# Patient Record
Sex: FEMALE | Race: WHITE | NOT HISPANIC OR LATINO | URBAN - METROPOLITAN AREA
[De-identification: names, ages, dates, MRNs, and addresses within clinical notes are randomized per-mention and may not be internally consistent; named-entity substitution may affect disease eponyms.]

---

## 2018-07-27 ENCOUNTER — OUTPATIENT (OUTPATIENT)
Dept: OUTPATIENT SERVICES | Facility: HOSPITAL | Age: 68
LOS: 1 days | Discharge: HOME | End: 2018-07-27

## 2018-07-27 DIAGNOSIS — R06.9 UNSPECIFIED ABNORMALITIES OF BREATHING: ICD-10-CM

## 2018-07-27 DIAGNOSIS — R06.02 SHORTNESS OF BREATH: ICD-10-CM

## 2019-07-01 PROBLEM — Z00.00 ENCOUNTER FOR PREVENTIVE HEALTH EXAMINATION: Status: ACTIVE | Noted: 2019-07-01

## 2019-07-09 ENCOUNTER — APPOINTMENT (OUTPATIENT)
Dept: CARDIOLOGY | Facility: CLINIC | Age: 69
End: 2019-07-09
Payer: MEDICARE

## 2019-07-09 ENCOUNTER — RECORD ABSTRACTING (OUTPATIENT)
Age: 69
End: 2019-07-09

## 2019-07-09 VITALS
SYSTOLIC BLOOD PRESSURE: 116 MMHG | BODY MASS INDEX: 27.6 KG/M2 | DIASTOLIC BLOOD PRESSURE: 74 MMHG | HEART RATE: 85 BPM | HEIGHT: 62 IN | WEIGHT: 150 LBS

## 2019-07-09 DIAGNOSIS — M19.90 UNSPECIFIED OSTEOARTHRITIS, UNSPECIFIED SITE: ICD-10-CM

## 2019-07-09 DIAGNOSIS — E72.12 METHYLENETETRAHYDROFOLATE REDUCTASE DEFICIENCY: ICD-10-CM

## 2019-07-09 DIAGNOSIS — Z86.2 PERSONAL HISTORY OF DISEASES OF THE BLOOD AND BLOOD-FORMING ORGANS AND CERTAIN DISORDERS INVOLVING THE IMMUNE MECHANISM: ICD-10-CM

## 2019-07-09 DIAGNOSIS — R32 UNSPECIFIED URINARY INCONTINENCE: ICD-10-CM

## 2019-07-09 DIAGNOSIS — Z86.010 PERSONAL HISTORY OF COLONIC POLYPS: ICD-10-CM

## 2019-07-09 DIAGNOSIS — Z92.89 PERSONAL HISTORY OF OTHER MEDICAL TREATMENT: ICD-10-CM

## 2019-07-09 DIAGNOSIS — I74.9 EMBOLISM AND THROMBOSIS OF UNSPECIFIED ARTERY: ICD-10-CM

## 2019-07-09 DIAGNOSIS — Z78.9 OTHER SPECIFIED HEALTH STATUS: ICD-10-CM

## 2019-07-09 DIAGNOSIS — Z86.79 PERSONAL HISTORY OF OTHER DISEASES OF THE CIRCULATORY SYSTEM: ICD-10-CM

## 2019-07-09 PROCEDURE — 93000 ELECTROCARDIOGRAM COMPLETE: CPT

## 2019-07-09 PROCEDURE — 99214 OFFICE O/P EST MOD 30 MIN: CPT

## 2019-07-09 RX ORDER — MULTIVIT-MIN/FOLIC/VIT K/LYCOP 400-300MCG
50 MCG TABLET ORAL
Qty: 90 | Refills: 1 | Status: ACTIVE | COMMUNITY

## 2019-07-09 NOTE — PHYSICAL EXAM
[Normal Appearance] : normal appearance [General Appearance - Well Developed] : well developed [No Deformities] : no deformities [Well Groomed] : well groomed [General Appearance - Well Nourished] : well nourished [General Appearance - In No Acute Distress] : no acute distress [Normal Conjunctiva] : the conjunctiva exhibited no abnormalities [Normal Oral Mucosa] : normal oral mucosa [Normal Oropharynx] : normal oropharynx [] : no respiratory distress [FreeTextEntry1] : no JVD [Respiration, Rhythm And Depth] : normal respiratory rhythm and effort [Auscultation Breath Sounds / Voice Sounds] : lungs were clear to auscultation bilaterally [Heart Rate And Rhythm] : heart rate and rhythm were normal [Murmurs] : no murmurs present [Heart Sounds] : normal S1 and S2 [Bowel Sounds] : normal bowel sounds [Edema] : no peripheral edema present [Abdomen Soft] : soft [Abdomen Tenderness] : non-tender [Abnormal Walk] : normal gait [Nail Clubbing] : no clubbing of the fingernails [Skin Color & Pigmentation] : normal skin color and pigmentation [Cyanosis, Localized] : no localized cyanosis [No Venous Stasis] : no venous stasis [Oriented To Time, Place, And Person] : oriented to person, place, and time [Affect] : the affect was normal

## 2019-07-09 NOTE — ASSESSMENT
[FreeTextEntry1] :  The history was reviewed, and discussed with the patient.\par Prior work-up reviewed.\par Given the increase in symptoms, nuclear stress test and CTA results - occluded LIMA - would consider cath. \par She is hezitant, so will get a non-invasive study instead (Nuclear MPI).\par Due to her claudication, will be able to look at the peripheral vessels as well.  C/w medical therapy - discussed in detail.\par C/w BB and Losartan for BP control.\par C/w metoprolol, statin.\par C/w Ranexa.\par Hematology management discussed with Dr. Martinez - will continue with Plavix for now.

## 2019-07-09 NOTE — HISTORY OF PRESENT ILLNESS
[FreeTextEntry1] : The patient presents for a f/u of chest pain, upper left side, worse with exertion, as well as throat pain, non-exertional, difficulty swallowing. CTA was done and revealed occluded LIMA to LAD. Distal LAD occlusion. No palpitations. She had colonoscopy and several polyps were removed. Had sonogram with GI office - was told she has a "plaque in the aorta".  She has a long history of CAD, s/p PCI, complicated by acute stent thrombosis, requiring CABG, had vascular injury in the process, requiring vascular surgery, thrombectomy. During hospitalization had DVT. The patient was diagnosed with heterozygous Factor V Leiden and MTHFR and was on Coumadin for several years, but subsequently switched to ASA. She also had symptoms which were suggestive of temporal arteritis, and had a biopsy in [place], which was negative. She subsequently had a questionable diagnosis of Giant Cell arteritis on femoral artery biopsy, also done in [place], but diagnosis was never confirmed. Could not tolerate ASA due to GI symptoms, was switched to Plavix.\par Last stress test submaximal, but negative to the level achieved. Was seen by Dr. Martinez for Hematology. Initially planned for anticoagulation. I have discussed her history with him and we made a decision to c/w antiplatelets, as her event appears to have been provoked, and she had no recurrent evens since.

## 2019-07-18 ENCOUNTER — FORM ENCOUNTER (OUTPATIENT)
Age: 69
End: 2019-07-18

## 2019-07-19 ENCOUNTER — OUTPATIENT (OUTPATIENT)
Dept: OUTPATIENT SERVICES | Facility: HOSPITAL | Age: 69
LOS: 1 days | Discharge: HOME | End: 2019-07-19
Payer: MEDICARE

## 2019-07-19 DIAGNOSIS — R06.00 DYSPNEA, UNSPECIFIED: ICD-10-CM

## 2019-07-19 PROCEDURE — 78452 HT MUSCLE IMAGE SPECT MULT: CPT | Mod: 26

## 2020-03-26 RX ORDER — CLOPIDOGREL BISULFATE 75 MG/1
75 TABLET, FILM COATED ORAL DAILY
Qty: 90 | Refills: 2 | Status: ACTIVE | COMMUNITY
Start: 1900-01-01 | End: 1900-01-01

## 2020-04-10 ENCOUNTER — APPOINTMENT (OUTPATIENT)
Dept: CARDIOLOGY | Facility: CLINIC | Age: 70
End: 2020-04-10
Payer: MEDICARE

## 2020-04-10 PROCEDURE — 99213 OFFICE O/P EST LOW 20 MIN: CPT | Mod: 95

## 2020-04-10 NOTE — PHYSICAL EXAM
[General Appearance - Well Developed] : well developed [Normal Appearance] : normal appearance [Well Groomed] : well groomed [General Appearance - Well Nourished] : well nourished [No Deformities] : no deformities [General Appearance - In No Acute Distress] : no acute distress [Normal Conjunctiva] : the conjunctiva exhibited no abnormalities [Normal Oral Mucosa] : normal oral mucosa [Normal Oropharynx] : normal oropharynx [FreeTextEntry1] : no JVD [] : no respiratory distress [Oriented To Time, Place, And Person] : oriented to person, place, and time [Affect] : the affect was normal

## 2020-04-10 NOTE — ASSESSMENT
[FreeTextEntry1] : Due to COVID-19 epidemic, a telemedicine visit was conducted, using audio and video connection. Patient gave verbal consent to telemedicine visit.\par \par \par  The history was reviewed, and discussed with the patient.\par Prior work-up reviewed.\par Given the increase in symptoms, nuclear stress test and CTA results - occluded LIMA - would consider cath, but will hold off due to the current COVID-19 epidemic. \par If worsening symptoms will bring her in as an emergency, otherwise, will continue with medical therapy until the COVID-19 situation improves.\par Due to her claudication, will be able to look at the peripheral vessels as well.  C/w medical therapy - discussed in detail.\par C/w BB and Losartan for BP control.\par C/w metoprolol, statin.\par C/w Ranexa.\par Hematology management previously discussed with Dr. Martinez - will continue with Plavix for now.

## 2020-04-24 ENCOUNTER — TRANSCRIPTION ENCOUNTER (OUTPATIENT)
Age: 70
End: 2020-04-24

## 2020-06-09 ENCOUNTER — LABORATORY RESULT (OUTPATIENT)
Age: 70
End: 2020-06-09

## 2020-06-11 ENCOUNTER — OUTPATIENT (OUTPATIENT)
Dept: OUTPATIENT SERVICES | Facility: HOSPITAL | Age: 70
LOS: 1 days | Discharge: HOME | End: 2020-06-11
Payer: MEDICARE

## 2020-06-11 DIAGNOSIS — Z95.1 PRESENCE OF AORTOCORONARY BYPASS GRAFT: Chronic | ICD-10-CM

## 2020-06-11 LAB
ANION GAP SERPL CALC-SCNC: 10 MMOL/L — SIGNIFICANT CHANGE UP (ref 7–14)
BUN SERPL-MCNC: 15 MG/DL — SIGNIFICANT CHANGE UP (ref 10–20)
CALCIUM SERPL-MCNC: 9.1 MG/DL — SIGNIFICANT CHANGE UP (ref 8.5–10.1)
CHLORIDE SERPL-SCNC: 98 MMOL/L — SIGNIFICANT CHANGE UP (ref 98–110)
CO2 SERPL-SCNC: 24 MMOL/L — SIGNIFICANT CHANGE UP (ref 17–32)
CREAT SERPL-MCNC: 0.9 MG/DL — SIGNIFICANT CHANGE UP (ref 0.7–1.5)
GLUCOSE SERPL-MCNC: 103 MG/DL — HIGH (ref 70–99)
HCT VFR BLD CALC: 36.9 % — LOW (ref 37–47)
HGB BLD-MCNC: 12.1 G/DL — SIGNIFICANT CHANGE UP (ref 12–16)
MCHC RBC-ENTMCNC: 26.1 PG — LOW (ref 27–31)
MCHC RBC-ENTMCNC: 32.8 G/DL — SIGNIFICANT CHANGE UP (ref 32–37)
MCV RBC AUTO: 79.5 FL — LOW (ref 81–99)
NRBC # BLD: 0 /100 WBCS — SIGNIFICANT CHANGE UP (ref 0–0)
PLATELET # BLD AUTO: 146 K/UL — SIGNIFICANT CHANGE UP (ref 130–400)
POTASSIUM SERPL-MCNC: 4.9 MMOL/L — SIGNIFICANT CHANGE UP (ref 3.5–5)
POTASSIUM SERPL-SCNC: 4.9 MMOL/L — SIGNIFICANT CHANGE UP (ref 3.5–5)
RBC # BLD: 4.64 M/UL — SIGNIFICANT CHANGE UP (ref 4.2–5.4)
RBC # FLD: 14.2 % — SIGNIFICANT CHANGE UP (ref 11.5–14.5)
SODIUM SERPL-SCNC: 132 MMOL/L — LOW (ref 135–146)
WBC # BLD: 4.83 K/UL — SIGNIFICANT CHANGE UP (ref 4.8–10.8)
WBC # FLD AUTO: 4.83 K/UL — SIGNIFICANT CHANGE UP (ref 4.8–10.8)

## 2020-06-11 PROCEDURE — 36247 INS CATH ABD/L-EXT ART 3RD: CPT

## 2020-06-11 PROCEDURE — 93459 L HRT ART/GRFT ANGIO: CPT | Mod: 26

## 2020-06-11 PROCEDURE — 75716 ARTERY X-RAYS ARMS/LEGS: CPT | Mod: 26,59

## 2020-06-11 PROCEDURE — 36248 INS CATH ABD/L-EXT ART ADDL: CPT

## 2020-06-11 RX ORDER — ROSUVASTATIN CALCIUM 5 MG/1
1 TABLET ORAL
Qty: 0 | Refills: 0 | DISCHARGE

## 2020-06-11 RX ORDER — MEMANTINE HYDROCHLORIDE 10 MG/1
1 TABLET ORAL
Qty: 0 | Refills: 0 | DISCHARGE

## 2020-06-11 RX ORDER — LOSARTAN POTASSIUM 100 MG/1
1 TABLET, FILM COATED ORAL
Qty: 0 | Refills: 0 | DISCHARGE

## 2020-06-11 RX ORDER — RANOLAZINE 500 MG/1
1 TABLET, FILM COATED, EXTENDED RELEASE ORAL
Qty: 0 | Refills: 0 | DISCHARGE

## 2020-06-11 RX ORDER — CLOPIDOGREL BISULFATE 75 MG/1
1 TABLET, FILM COATED ORAL
Qty: 0 | Refills: 0 | DISCHARGE

## 2020-06-11 RX ORDER — DEXLANSOPRAZOLE 30 MG/1
1 CAPSULE, DELAYED RELEASE ORAL
Qty: 0 | Refills: 0 | DISCHARGE

## 2020-06-11 RX ORDER — METOPROLOL TARTRATE 50 MG
1 TABLET ORAL
Qty: 0 | Refills: 0 | DISCHARGE

## 2020-06-11 NOTE — CHART NOTE - NSCHARTNOTEFT_GEN_A_CORE
PRE-OP DIAGNOSIS: CAD s/p CABG (LIMA to LAD ), PAD, hx of CAD s/p MI PCI complicated by thrombosis s/p urgent CABG with LIMA to LAD and complicated by femoral artery aneurysm s/p vascular repair.     PROCEDURE: [facility] with coronary angiography, Peripheral angiography    Physician: DR Hannah  Assistant: Noah Kline    ANESTHESIA TYPE:  [  ]General Anesthesia  [  ] Sedation  [ x ] Local/Regional    ESTIMATED BLOOD LOSS:    10   mL    CONDITION  [  ] Critical  [  ] Serious  [  ]Fair  [ x ]Good      SPECIMENS REMOVED (IF APPLICABLE): N/A      IV CONTRAST:     150    mL      IMPLANTS (IF APPLICABLE)      FINDINGS    Left Heart Catheterization:  LVEDP: normal       LEFT HEART CATHETERIZATION                                    Left main normal      LAD: prox 30% lesion , MId/distal: minor irregularities                       Diag: patent     Left Circumflex: normal   OM: patent     Right Coronary Artery: minor irregularities  RPDA normal     LIMA to LAD , occleded     DOMINANCE: Right    Peripheral angiogram:  patent bilateral iliac, femoral (SFA/Deep femoral), popliteal  arteries, slow flow     ACCESS: left radial   CLOSURE: Dstat    INTERVENTION  none         POST-OP DIAGNOSIS  non obstructive CAD  LIMA to LAD occluded  patent bilateral lower extremities  arteries wit slow flow likely secondary to distal microvascular disease         PLAN OF CARE  [x ] D/C Home today  continue home medication    Results of procedure/ plan of care discussed with patient/  in detail. PRE-OP DIAGNOSIS: CAD s/p CABG (LIMA to LAD ), PAD, hx of CAD s/p MI PCI complicated by thrombosis s/p urgent CABG with LIMA to LAD and complicated by femoral artery aneurysm s/p vascular repair.     PROCEDURE: [facility] with coronary angiography, Peripheral angiography    Physician: DR Hannah  Assistant: Noah Kline    ANESTHESIA TYPE:  [  ]General Anesthesia  [  ] Sedation  [ x ] Local/Regional    ESTIMATED BLOOD LOSS:    10   mL    CONDITION  [  ] Critical  [  ] Serious  [  ]Fair  [ x ]Good      SPECIMENS REMOVED (IF APPLICABLE): N/A      IV CONTRAST:     150    mL      IMPLANTS (IF APPLICABLE)      FINDINGS    Left Heart Catheterization:  LVEDP: normal       LEFT HEART CATHETERIZATION                                    Left main normal      LAD: prox 30% lesion , MId/distal: minor irregularities                       Diag: patent     Left Circumflex: normal   OM: patent     Right Coronary Artery: minor irregularities  RPDA normal     LIMA to LAD , occluded     DOMINANCE: Right    Peripheral angiogram:  patent bilateral iliac, femoral (SFA/Deep femoral), popliteal  arteries, slow flow     ACCESS: left radial   CLOSURE: Dstat    INTERVENTION  none         POST-OP DIAGNOSIS  non obstructive CAD  LIMA to LAD occluded  patent bilateral lower extremities  arteries wit slow flow likely secondary to distal microvascular disease         PLAN OF CARE  [x ] D/C Home today  continue home medication    Results of procedure/ plan of care discussed with patient/  in detail.

## 2020-06-11 NOTE — H&P CARDIOLOGY - PMH
Coronary artery disease    Factor V Leiden mutation    Hyperlipidemia    Hypertension    MTHFR gene mutation    PVD (peripheral vascular disease)

## 2020-06-11 NOTE — H&P CARDIOLOGY - HISTORY OF PRESENT ILLNESS
69 y/o F with PMH of HTN, DLD MI, CAD s/p PCI, CABG with LIMA to LAD, PVD with possible giant cell arteritis on femoral artery, factor V Leiden mutation and MTHFR mutation previously on coumadin and currently on plavix presented with left sided chest pain worsened on exertion and claudication in the legs. Previous CCTA showed possible occluded LIMA.

## 2020-06-12 PROBLEM — I10 ESSENTIAL (PRIMARY) HYPERTENSION: Chronic | Status: ACTIVE | Noted: 2020-06-11

## 2020-06-12 PROBLEM — I25.10 ATHEROSCLEROTIC HEART DISEASE OF NATIVE CORONARY ARTERY WITHOUT ANGINA PECTORIS: Chronic | Status: ACTIVE | Noted: 2020-06-11

## 2020-06-12 PROBLEM — E72.12 METHYLENETETRAHYDROFOLATE REDUCTASE DEFICIENCY: Chronic | Status: ACTIVE | Noted: 2020-06-11

## 2020-06-12 PROBLEM — E78.5 HYPERLIPIDEMIA, UNSPECIFIED: Chronic | Status: ACTIVE | Noted: 2020-06-11

## 2020-06-12 PROBLEM — I73.9 PERIPHERAL VASCULAR DISEASE, UNSPECIFIED: Chronic | Status: ACTIVE | Noted: 2020-06-11

## 2020-06-12 PROBLEM — D68.51 ACTIVATED PROTEIN C RESISTANCE: Chronic | Status: ACTIVE | Noted: 2020-06-11

## 2020-06-15 DIAGNOSIS — I25.118 ATHEROSCLEROTIC HEART DISEASE OF NATIVE CORONARY ARTERY WITH OTHER FORMS OF ANGINA PECTORIS: ICD-10-CM

## 2020-06-15 DIAGNOSIS — I25.2 OLD MYOCARDIAL INFARCTION: ICD-10-CM

## 2020-06-15 DIAGNOSIS — Z95.1 PRESENCE OF AORTOCORONARY BYPASS GRAFT: ICD-10-CM

## 2020-06-15 DIAGNOSIS — Z82.49 FAMILY HISTORY OF ISCHEMIC HEART DISEASE AND OTHER DISEASES OF THE CIRCULATORY SYSTEM: ICD-10-CM

## 2020-06-15 DIAGNOSIS — Z87.891 PERSONAL HISTORY OF NICOTINE DEPENDENCE: ICD-10-CM

## 2020-06-15 DIAGNOSIS — R94.39 ABNORMAL RESULT OF OTHER CARDIOVASCULAR FUNCTION STUDY: ICD-10-CM

## 2020-06-15 DIAGNOSIS — I10 ESSENTIAL (PRIMARY) HYPERTENSION: ICD-10-CM

## 2020-06-15 DIAGNOSIS — E78.49 OTHER HYPERLIPIDEMIA: ICD-10-CM

## 2020-06-15 DIAGNOSIS — I70.213 ATHEROSCLEROSIS OF NATIVE ARTERIES OF EXTREMITIES WITH INTERMITTENT CLAUDICATION, BILATERAL LEGS: ICD-10-CM

## 2020-06-15 DIAGNOSIS — Z79.02 LONG TERM (CURRENT) USE OF ANTITHROMBOTICS/ANTIPLATELETS: ICD-10-CM

## 2020-06-15 DIAGNOSIS — D68.51 ACTIVATED PROTEIN C RESISTANCE: ICD-10-CM

## 2020-07-17 ENCOUNTER — APPOINTMENT (OUTPATIENT)
Dept: CARDIOLOGY | Facility: CLINIC | Age: 70
End: 2020-07-17
Payer: MEDICARE

## 2020-07-17 ENCOUNTER — RESULT CHARGE (OUTPATIENT)
Age: 70
End: 2020-07-17

## 2020-07-17 VITALS — DIASTOLIC BLOOD PRESSURE: 82 MMHG | WEIGHT: 150 LBS | SYSTOLIC BLOOD PRESSURE: 142 MMHG | BODY MASS INDEX: 27.44 KG/M2

## 2020-07-17 PROCEDURE — 99213 OFFICE O/P EST LOW 20 MIN: CPT

## 2020-07-17 PROCEDURE — 93000 ELECTROCARDIOGRAM COMPLETE: CPT

## 2020-07-17 RX ORDER — MEMANTINE HYDROCHLORIDE 10 MG/1
10 TABLET, FILM COATED ORAL
Refills: 0 | Status: DISCONTINUED | COMMUNITY
End: 2020-07-17

## 2020-07-17 RX ORDER — RANOLAZINE 500 MG/1
500 TABLET, FILM COATED, EXTENDED RELEASE ORAL
Refills: 0 | Status: DISCONTINUED | COMMUNITY
End: 2020-07-17

## 2020-07-17 NOTE — ASSESSMENT
[FreeTextEntry1] : Cath - non-obstructive.\par C/w medical therapy.\par  \par Prior work-up reviewed.\par \par C/w BB and Losartan for BP control.\par C/w metoprolol, statin.\par C/w PRN nitro\par Hematology management previously discussed with Dr. Martinez - will continue with Plavix for now.\par \par RTC in 6 months.

## 2020-07-17 NOTE — PHYSICAL EXAM
[Well Groomed] : well groomed [Normal Appearance] : normal appearance [General Appearance - Well Developed] : well developed [No Deformities] : no deformities [General Appearance - Well Nourished] : well nourished [General Appearance - In No Acute Distress] : no acute distress [Normal Oral Mucosa] : normal oral mucosa [Normal Oropharynx] : normal oropharynx [Normal Conjunctiva] : the conjunctiva exhibited no abnormalities [FreeTextEntry1] : no JVD [] : no respiratory distress [Auscultation Breath Sounds / Voice Sounds] : lungs were clear to auscultation bilaterally [Respiration, Rhythm And Depth] : normal respiratory rhythm and effort [Heart Rate And Rhythm] : heart rate and rhythm were normal [Heart Sounds] : normal S1 and S2 [Murmurs] : no murmurs present [Edema] : no peripheral edema present [Abdomen Soft] : soft [Bowel Sounds] : normal bowel sounds [Abdomen Tenderness] : non-tender [Nail Clubbing] : no clubbing of the fingernails [Abnormal Walk] : normal gait [Cyanosis, Localized] : no localized cyanosis [Oriented To Time, Place, And Person] : oriented to person, place, and time [Affect] : the affect was normal

## 2020-07-17 NOTE — HISTORY OF PRESENT ILLNESS
[FreeTextEntry1] : F/u after cath. non-obstructive CAD. LIMA occluded. Access site healed well.\par \par The patient presents for a f/u of chest pain, upper left side, worse with exertion, as well as throat pain, nonexertional, difficulty swallowing. CTA was done and revealed occluded LIMA to LAD. Distal LAD occlusion. No palpitations. She had colonoscopy and several polyps were removed. Had sonogram with GI office - was told she has a "plaque in the aorta".  She has a long history of CAD, s/p PCI, complicated by acute stent thrombosis, requiring CABG, had vascular injury in the process, requiring vascular surgery, thrombectomy. During hospitalization had DVT. The patient was diagnosed with heterozygous Factor V Leiden and MTHFR and was on Coumadin for several years, but subsequently switched to ASA. She also had symptoms which were suggestive of temporal arteritis, and had a biopsy in [facility], which was negative. She subsequently had a questionable diagnosis of Giant Cell arteritis on femoral artery biopsy, also done in [place], but diagnosis was never confirmed. Could not tolerate ASA due to GI symptoms, was switched to Plavix.\par Last stress test submaximal, but negative to the level achieved. Was seen by Dr. Martinez for Hematology. Initially planned for anticoagulation. I have discussed her history with him and we made a decision to c/w antiplatelets, as her event appears to have been provoked, and she had no recurrent evens since.

## 2021-02-26 ENCOUNTER — RX RENEWAL (OUTPATIENT)
Age: 71
End: 2021-02-26

## 2021-03-04 RX ORDER — METOPROLOL SUCCINATE 25 MG/1
25 TABLET, EXTENDED RELEASE ORAL
Qty: 90 | Refills: 3 | Status: ACTIVE | COMMUNITY
Start: 1900-01-01 | End: 1900-01-01

## 2021-05-13 NOTE — ASU PATIENT PROFILE, ADULT - ACCEPTABLE
Head,  normocephalic,  atraumatic,  Face,  Face within normal limits,  Ears,  External ears within normal limits,  Nose/Nasopharynx,  External nose  normal appearance,  nares patent,  no nasal discharge,  Mouth and Throat,  Oral cavity appearance normal,  Breath odor normal,  Lips,  Appearance normal 0

## 2021-09-10 ENCOUNTER — NON-APPOINTMENT (OUTPATIENT)
Age: 71
End: 2021-09-10

## 2021-09-10 ENCOUNTER — APPOINTMENT (OUTPATIENT)
Dept: CARDIOLOGY | Facility: CLINIC | Age: 71
End: 2021-09-10
Payer: MEDICARE

## 2021-09-10 VITALS
SYSTOLIC BLOOD PRESSURE: 140 MMHG | RESPIRATION RATE: 16 BRPM | OXYGEN SATURATION: 93 % | HEIGHT: 62 IN | TEMPERATURE: 97 F | WEIGHT: 145 LBS | BODY MASS INDEX: 26.68 KG/M2 | HEART RATE: 82 BPM | DIASTOLIC BLOOD PRESSURE: 90 MMHG

## 2021-09-10 PROCEDURE — 99214 OFFICE O/P EST MOD 30 MIN: CPT

## 2021-09-10 PROCEDURE — 93000 ELECTROCARDIOGRAM COMPLETE: CPT

## 2021-09-10 RX ORDER — ROSUVASTATIN CALCIUM 5 MG/1
5 TABLET, FILM COATED ORAL DAILY
Qty: 90 | Refills: 3 | Status: ACTIVE | COMMUNITY
Start: 2021-02-26 | End: 1900-01-01

## 2021-09-10 NOTE — HISTORY OF PRESENT ILLNESS
[FreeTextEntry1] : Presenting for f/u. Had cath last year - non-obstructive CAD. LIMA occluded.  Clinically stable. She needs clearance for  EGD.\par \par The patient presents for a f/u of chest pain, upper left side, worse with exertion, as well as throat pain, nonexertional, difficulty swallowing. CTA was done and revealed occluded LIMA to LAD. Distal LAD occlusion. No palpitations. She had colonoscopy and several polyps were removed. Had sonogram with GI office - was told she has a "plaque in the aorta".  She has a long history of CAD, s/p PCI, complicated by acute stent thrombosis, requiring CABG, had vascular injury in the process, requiring vascular surgery, thrombectomy. During hospitalization had DVT. The patient was diagnosed with heterozygous Factor V Leiden and MTHFR and was on Coumadin for several years, but subsequently switched to ASA. She also had symptoms which were suggestive of temporal arteritis, and had a biopsy in [facility], which was negative. She subsequently had a questionable diagnosis of Giant Cell arteritis on femoral artery biopsy, also done in [place], but diagnosis was never confirmed. Could not tolerate ASA due to GI symptoms, was switched to Plavix.\par Last stress test submaximal, but negative to the level achieved. Was seen by Dr. Martinez for Hematology. Initially planned for anticoagulation. I have discussed her history with him and we made a decision to c/w antiplatelets, as her event appears to have been provoked, and she had no recurrent evens since.

## 2021-09-10 NOTE — ASSESSMENT
[FreeTextEntry1] : Cath - non-obstructive.\par C/w medical therapy.\par  \par Prior work-up reviewed.\par \par C/w BB and Losartan for BP control.\par C/w metoprolol, statin.\par C/w PRN nitro\par Hematology management previously discussed with Dr. Martinez - will continue with Plavix for now.\par No contraindication for the planned EGD - letter given.\par Management of Plavix prior to procedure discussed - will hold for 5 days.\par \par RTC in 6 months.

## 2022-07-04 ENCOUNTER — NON-APPOINTMENT (OUTPATIENT)
Age: 72
End: 2022-07-04

## 2022-07-05 ENCOUNTER — TRANSCRIPTION ENCOUNTER (OUTPATIENT)
Age: 72
End: 2022-07-05

## 2022-07-05 RX ORDER — NIRMATRELVIR AND RITONAVIR 150-100 MG
10 X 150 MG & KIT ORAL TWICE DAILY
Qty: 30 | Refills: 0 | Status: ACTIVE | COMMUNITY
Start: 2022-07-05 | End: 1900-01-01

## 2022-07-06 ENCOUNTER — TRANSCRIPTION ENCOUNTER (OUTPATIENT)
Age: 72
End: 2022-07-06

## 2022-07-08 ENCOUNTER — APPOINTMENT (OUTPATIENT)
Age: 72
End: 2022-07-08

## 2022-10-13 NOTE — ASU PATIENT PROFILE, ADULT - FALL HARM RISK
Problem: PAIN - ADULT  Goal: Verbalizes/displays adequate comfort level or baseline comfort level  Description: Interventions:  - Encourage patient to monitor pain and request assistance  - Assess pain using appropriate pain scale  - Administer analgesics based on type and severity of pain and evaluate response  - Implement non-pharmacological measures as appropriate and evaluate response  - Consider cultural and social influences on pain and pain management  - Notify physician/advanced practitioner if interventions unsuccessful or patient reports new pain  Outcome: Progressing     Problem: INFECTION - ADULT  Goal: Absence or prevention of progression during hospitalization  Description: INTERVENTIONS:  - Assess and monitor for signs and symptoms of infection  - Monitor lab/diagnostic results  - Monitor all insertion sites, i e  indwelling lines, tubes, and drains  - Monitor endotracheal if appropriate and nasal secretions for changes in amount and color  - [place] appropriate cooling/warming therapies per order  - Administer medications as ordered  - Instruct and encourage patient and family to use good hand hygiene technique  - Identify and instruct in appropriate isolation precautions for identified infection/condition  Outcome: Progressing     Problem: SAFETY ADULT  Goal: Patient will remain free of falls  Description: INTERVENTIONS:  - Educate patient/family on patient safety including physical limitations  - Instruct patient to call for assistance with activity   - Consult OT/PT to assist with strengthening/mobility   - Keep Call bell within reach  - Keep bed low and locked with side rails adjusted as appropriate  - Keep care items and personal belongings within reach  - Initiate and maintain comfort rounds  - Make Fall Risk Sign visible to staff  - Offer Toileting every 2 Hours, in advance of need  - Obtain necessary fall risk management equipment: nonskid footwear  - Apply yellow socks and bracelet for high fall risk patients  - Consider moving patient to room near nurses station  Outcome: Progressing     Problem: DISCHARGE PLANNING  Goal: Discharge to home or other facility with appropriate resources  Description: INTERVENTIONS:  - Identify barriers to discharge w/patient and caregiver  - Arrange for needed discharge resources and transportation as appropriate  - Identify discharge learning needs (meds, wound care, etc )  - Arrange for interpretive services to assist at discharge as needed  - Refer to Case Management Department for coordinating discharge planning if the patient needs post-hospital services based on physician/advanced practitioner order or complex needs related to functional status, cognitive ability, or social support system  Outcome: Progressing     Problem: Knowledge Deficit  Goal: Patient/family/caregiver demonstrates understanding of disease process, treatment plan, medications, and discharge instructions  Description: Complete learning assessment and assess knowledge base    Interventions:  - Provide teaching at level of understanding  - Provide teaching via preferred learning methods  Outcome: Progressing     Problem: GENITOURINARY - ADULT  Goal: Maintains or returns to baseline urinary function  Description: INTERVENTIONS:  - Assess urinary function  - Encourage oral fluids to ensure adequate hydration if ordered  - Administer IV fluids as ordered to ensure adequate hydration  - Administer ordered medications as needed  - Offer frequent toileting  - Follow urinary retention protocol if ordered  Outcome: Progressing  Goal: Absence of urinary retention  Description: INTERVENTIONS:  - Assess patient’s ability to void and empty bladder  - Monitor I/O  - Bladder scan as needed  - Discuss with physician/AP medications to alleviate retention as needed  - Discuss catheterization for long term situations as appropriate  Outcome: Progressing  Goal: Urinary catheter remains patent  Description: INTERVENTIONS:  - Assess patency of urinary catheter  - If patient has a chronic campbell, consider changing catheter if non-functioning  - Follow guidelines for intermittent irrigation of non-functioning urinary catheter  Outcome: Progressing     Problem: METABOLIC, FLUID AND ELECTROLYTES - ADULT  Goal: Electrolytes maintained within normal limits  Description: INTERVENTIONS:  - Monitor labs and assess patient for signs and symptoms of electrolyte imbalances  - Administer electrolyte replacement as ordered  - Monitor response to electrolyte replacements, including repeat lab results as appropriate  - Instruct patient on fluid and nutrition as appropriate  Outcome: Progressing coagulation(Bleeding disorder R/T clinical cond/anti-coags)

## 2023-04-14 ENCOUNTER — APPOINTMENT (OUTPATIENT)
Dept: CARDIOLOGY | Facility: CLINIC | Age: 73
End: 2023-04-14
Payer: MEDICARE

## 2023-04-14 VITALS
DIASTOLIC BLOOD PRESSURE: 64 MMHG | SYSTOLIC BLOOD PRESSURE: 112 MMHG | HEART RATE: 69 BPM | BODY MASS INDEX: 25.97 KG/M2 | WEIGHT: 142 LBS

## 2023-04-14 DIAGNOSIS — I21.9 ACUTE MYOCARDIAL INFARCTION, UNSPECIFIED: ICD-10-CM

## 2023-04-14 DIAGNOSIS — F02.80 ALZHEIMER'S DISEASE, UNSPECIFIED: ICD-10-CM

## 2023-04-14 DIAGNOSIS — Z86.718 PERSONAL HISTORY OF OTHER VENOUS THROMBOSIS AND EMBOLISM: ICD-10-CM

## 2023-04-14 DIAGNOSIS — G30.9 ALZHEIMER'S DISEASE, UNSPECIFIED: ICD-10-CM

## 2023-04-14 DIAGNOSIS — I70.209 UNSPECIFIED ATHEROSCLEROSIS OF NATIVE ARTERIES OF EXTREMITIES, UNSPECIFIED EXTREMITY: ICD-10-CM

## 2023-04-14 PROCEDURE — 93000 ELECTROCARDIOGRAM COMPLETE: CPT

## 2023-04-14 PROCEDURE — 99214 OFFICE O/P EST MOD 30 MIN: CPT

## 2023-04-14 RX ORDER — OMEPRAZOLE 40 MG/1
40 CAPSULE, DELAYED RELEASE ORAL
Qty: 30 | Refills: 0 | Status: ACTIVE | COMMUNITY

## 2023-04-14 RX ORDER — ATOGEPANT 60 MG/1
60 TABLET ORAL DAILY
Refills: 0 | Status: ACTIVE | COMMUNITY

## 2023-04-14 RX ORDER — MEMANTINE HYDROCHLORIDE AND DONEPEZIL HYDROCHLORIDE 28; 10 MG/1; MG/1
28-10 CAPSULE ORAL DAILY
Refills: 0 | Status: ACTIVE | COMMUNITY

## 2023-04-14 RX ORDER — UBROGEPANT 100 MG/1
100 TABLET ORAL DAILY
Refills: 0 | Status: ACTIVE | COMMUNITY

## 2023-04-14 RX ORDER — NITROGLYCERIN 0.4 MG/1
0.4 TABLET SUBLINGUAL
Qty: 25 | Refills: 1 | Status: ACTIVE | COMMUNITY
Start: 2023-04-14 | End: 1900-01-01

## 2023-04-14 RX ORDER — SOLIFENACIN SUCCINATE 5 MG/1
5 TABLET ORAL DAILY
Refills: 0 | Status: ACTIVE | COMMUNITY

## 2023-04-14 RX ORDER — ONDANSETRON 8 MG/1
8 TABLET ORAL EVERY 8 HOURS
Qty: 10 | Refills: 0 | Status: DISCONTINUED | COMMUNITY
End: 2023-04-14

## 2023-04-14 RX ORDER — DEXLANSOPRAZOLE 60 MG/1
60 CAPSULE, DELAYED RELEASE ORAL
Refills: 0 | Status: DISCONTINUED | COMMUNITY
End: 2023-04-14

## 2023-04-14 RX ORDER — LOSARTAN POTASSIUM 50 MG/1
50 TABLET, FILM COATED ORAL DAILY
Refills: 0 | Status: DISCONTINUED | COMMUNITY
End: 2023-04-14

## 2023-04-14 NOTE — REASON FOR VISIT
[CV Risk Factors and Non-Cardiac Disease] : CV risk factors and non-cardiac disease [Hyperlipidemia] : hyperlipidemia [Hypertension] : hypertension [Spouse] : spouse [Family Member] : family member

## 2023-04-16 NOTE — PHYSICAL EXAM
[Well Developed] : well developed [No Acute Distress] : no acute distress [Normal Venous Pressure] : normal venous pressure [No Carotid Bruit] : no carotid bruit [Normal S1, S2] : normal S1, S2 [No Murmur] : no murmur [No Rub] : no rub [No Gallop] : no gallop [Clear Lung Fields] : clear lung fields [No Respiratory Distress] : no respiratory distress  [Normal Gait] : normal gait [No Edema] : no edema [No Clubbing] : no clubbing [No Rash] : no rash [Moves all extremities] : moves all extremities [No Focal Deficits] : no focal deficits [Alert and Oriented] : alert and oriented [Cognitive Impairment] : cognitive impairment [de-identified] : memory loss due to Dementia

## 2023-04-16 NOTE — HISTORY OF PRESENT ILLNESS
[FreeTextEntry1] : Pt is 73 year old Female with PMH of Dementia, HL,  MI 23 years ago in [place], \par S/p PCI complicate by acute stent thrombus requiring  CABG x 1 in 2000,\par DVT, was dx with Factor V Leiden, hx of Coumadin tx.  Pt couldn't  tolerate ASA \par in the past, was switched to Plavix that she tolerates well.\par Pt is here for f.u due to acute chest pain  radiating to L shoulder that occurred last night at 3 am and woke her up from sleep.  Pt took ntg sl x 1 which relieved her symptoms.  Pt denies chest pain at present, SOB with exertion, no edema \par Pt walks 30m QD without Angina \par 07/19/19 Nuclear Stress Test EF 55% No fixed perfusion defects \par 06/11/20 S/p Cardiac Cath non-obstructive CAD \par 11/16/22 Chol 254  TRIG 102

## 2023-04-16 NOTE — ASSESSMENT
[FreeTextEntry1] : Assessment:\par #CAD, old MI, S/p CABGx 1 \par 07/19/19 Nuclear Stress Test EF 55% No fixed perfusion defects \par 06/11/20 S/p Cardiac Cath non-obstructive CAD \par 07/19/19 Nuclear Stress Test EF 55% No fixed perfusion defects \par 06/11/20 S/p Cardiac Cath non-obstructive CAD \par #HL 11/16/22 Chol 254  TRIG 102 \par \par Plan:\par -TTE\par -Cont Plavix, Metoprolol, \par -Start NTG SL PRN angina \par -CBC,CMP, CK, TSH, Lipid profile \par -Low Chol, Low Na diet \par -Activities as tolerated \par -F/u with Dr Hannah \par \par ATT NOTE \par PT SEEN AND EXAMINED\par AGREE WITH ABOVE\par

## 2023-05-10 ENCOUNTER — APPOINTMENT (OUTPATIENT)
Dept: CARDIOLOGY | Facility: CLINIC | Age: 73
End: 2023-05-10
Payer: MEDICARE

## 2023-05-10 VITALS
BODY MASS INDEX: 25.76 KG/M2 | DIASTOLIC BLOOD PRESSURE: 65 MMHG | HEIGHT: 62 IN | WEIGHT: 140 LBS | SYSTOLIC BLOOD PRESSURE: 135 MMHG | TEMPERATURE: 97.3 F

## 2023-05-10 DIAGNOSIS — E78.5 HYPERLIPIDEMIA, UNSPECIFIED: ICD-10-CM

## 2023-05-10 DIAGNOSIS — Z95.1 PRESENCE OF AORTOCORONARY BYPASS GRAFT: ICD-10-CM

## 2023-05-10 DIAGNOSIS — I25.10 ATHEROSCLEROTIC HEART DISEASE OF NATIVE CORONARY ARTERY W/OUT ANGINA PECTORIS: ICD-10-CM

## 2023-05-10 PROCEDURE — 93000 ELECTROCARDIOGRAM COMPLETE: CPT

## 2023-05-10 PROCEDURE — 99214 OFFICE O/P EST MOD 30 MIN: CPT | Mod: 25

## 2023-05-10 PROCEDURE — 93306 TTE W/DOPPLER COMPLETE: CPT

## 2023-05-10 NOTE — HISTORY OF PRESENT ILLNESS
[FreeTextEntry1] : Presenting for f/u. Had cath last year - non-obstructive CAD. LIMA occluded.  Was seen for chest pain by Dr. Falk last month - pain improved. Echo done today - normal wall motion, no significant valvular disease. ECG is normal. Her dementia is progressing. Discussed findings with .\par \par The patient presents for a f/u of chest pain, upper left side, worse with exertion, as well as throat pain, nonexertional, difficulty swallowing. CTA was done and revealed occluded LIMA to LAD. Distal LAD occlusion. No palpitations. She had colonoscopy and several polyps were removed. Had sonogram with GI office - was told she has a "plaque in the aorta".  She has a long history of CAD, s/p PCI, complicated by acute stent thrombosis, requiring CABG, had vascular injury in the process, requiring vascular surgery, thrombectomy. During hospitalization had DVT. The patient was diagnosed with heterozygous Factor V Leiden and MTHFR and was on Coumadin for several years, but subsequently switched to ASA. She also had symptoms which were suggestive of temporal arteritis, and had a biopsy in [place], which was negative. She subsequently had a questionable diagnosis of Giant Cell arteritis on femoral artery biopsy, also done in [place], but diagnosis was never confirmed. Could not tolerate ASA due to GI symptoms, was switched to Plavix.\par Last stress test submaximal, but negative to the level achieved. Was seen by Dr. Martinez for Hematology. Initially planned for anticoagulation. I have discussed her history with him and we made a decision to c/w antiplatelets, as her event appears to have been provoked, and she had no recurrent evens since.

## 2023-05-10 NOTE — ASSESSMENT
[FreeTextEntry1] : Cath - non-obstructive.\par C/w medical therapy.\par  \par Prior work-up reviewed.\par \par C/w BB and Losartan for BP control.\par C/w metoprolol, statin.\par C/w PRN nitro\par Wheezing on exam - c/w Spiriva, rescue Proventil as needed.\par Hematology management previously discussed with Dr. Martinez - will continue with Plavix for now. can use QOD.\par Echo and ECG findings discussed.\par C/w conservative management for CAD\par Neurology f/u for management of AD\par \par \par RTC in 6 months.

## 2023-05-10 NOTE — PHYSICAL EXAM
[General Appearance - Well Developed] : well developed [Normal Appearance] : normal appearance [Well Groomed] : well groomed [General Appearance - Well Nourished] : well nourished [No Deformities] : no deformities [General Appearance - In No Acute Distress] : no acute distress [Normal Oral Mucosa] : normal oral mucosa [Normal Conjunctiva] : the conjunctiva exhibited no abnormalities [Normal Oropharynx] : normal oropharynx [FreeTextEntry1] : no JVD [] : no respiratory distress [Respiration, Rhythm And Depth] : normal respiratory rhythm and effort [Auscultation Breath Sounds / Voice Sounds] : lungs were clear to auscultation bilaterally [Heart Rate And Rhythm] : heart rate and rhythm were normal [Heart Sounds] : normal S1 and S2 [Murmurs] : no murmurs present [Edema] : no peripheral edema present [Bowel Sounds] : normal bowel sounds [Abdomen Soft] : soft [Abdomen Tenderness] : non-tender [Abnormal Walk] : normal gait [Nail Clubbing] : no clubbing of the fingernails [Cyanosis, Localized] : no localized cyanosis [Oriented To Time, Place, And Person] : oriented to person, place, and time [Affect] : the affect was normal

## 2023-08-16 NOTE — PHYSICAL EXAM
[General Appearance - Well Developed] : well developed [Normal Appearance] : normal appearance [Well Groomed] : well groomed [General Appearance - Well Nourished] : well nourished [No Deformities] : no deformities [General Appearance - In No Acute Distress] : no acute distress [Normal Conjunctiva] : the conjunctiva exhibited no abnormalities [Acute] : an acute visit for [Normal Oral Mucosa] : normal oral mucosa [Normal Oropharynx] : normal oropharynx [Urinary Complaints] : urinary complaints [Family Member] : family member [FreeTextEntry1] : no JVD [] : no respiratory distress [Respiration, Rhythm And Depth] : normal respiratory rhythm and effort [Auscultation Breath Sounds / Voice Sounds] : lungs were clear to auscultation bilaterally [Heart Rate And Rhythm] : heart rate and rhythm were normal [Heart Sounds] : normal S1 and S2 [Murmurs] : no murmurs present [Edema] : no peripheral edema present [Bowel Sounds] : normal bowel sounds [Abdomen Soft] : soft [Abdomen Tenderness] : non-tender [Abnormal Walk] : normal gait [Nail Clubbing] : no clubbing of the fingernails [Cyanosis, Localized] : no localized cyanosis [Oriented To Time, Place, And Person] : oriented to person, place, and time [Affect] : the affect was normal

## 2024-08-16 ENCOUNTER — APPOINTMENT (OUTPATIENT)
Dept: ORTHOPEDIC SURGERY | Facility: CLINIC | Age: 74
End: 2024-08-16
Payer: MEDICARE

## 2024-08-16 VITALS — WEIGHT: 150 LBS | HEIGHT: 63 IN | BODY MASS INDEX: 26.58 KG/M2

## 2024-08-16 PROCEDURE — 99203 OFFICE O/P NEW LOW 30 MIN: CPT | Mod: 25

## 2024-08-16 PROCEDURE — 73030 X-RAY EXAM OF SHOULDER: CPT | Mod: LT

## 2024-08-16 RX ORDER — ACETAMINOPHEN 650 MG/1
650 TABLET, EXTENDED RELEASE ORAL 3 TIMES DAILY
Qty: 90 | Refills: 0 | Status: ACTIVE | COMMUNITY
Start: 2024-08-16 | End: 1900-01-01

## 2024-08-20 ENCOUNTER — APPOINTMENT (OUTPATIENT)
Dept: ORTHOPEDIC SURGERY | Facility: CLINIC | Age: 74
End: 2024-08-20
Payer: MEDICARE

## 2024-08-20 DIAGNOSIS — S42.022A DISPLACED FRACTURE OF SHAFT OF LEFT CLAVICLE, INITIAL ENCOUNTER FOR CLOSED FRACTURE: ICD-10-CM

## 2024-08-20 PROCEDURE — 23500 CLTX CLAVICULAR FX W/O MNPJ: CPT | Mod: LT

## 2024-08-20 PROCEDURE — 99203 OFFICE O/P NEW LOW 30 MIN: CPT | Mod: 25

## 2024-08-20 NOTE — HISTORY OF PRESENT ILLNESS
[de-identified] : The patient is a 74-year-old female with a history of dementia who is now 4 days out from sustaining a left displaced clavicle fracture.  She is accompanied by her daughter and .  Her daughter assisted with interpretation.  They were seen by one of the PAs 4 days ago.  She has been nonweightbearing to the left upper extremity.  She has been using the sling but has been taking it off occasionally.  The patient's daughter is concerned about the bruising and swelling over the collarbone.  She has been taking over-the-counter Tylenol as needed for pain.  The patient is well-appearing.  Examination of the left shoulder demonstrates a sling in place.  Skin is intact over the clavicle.  There is no skin tenting.  There is extensive ecchymosis around the clavicle that tracks down the anterior chest.  There is a deformity over the level of the fracture.  This is tender to palpation.  Neurovascularly intact distally.  Further shoulder range of motion exam was deferred due to proximity to the injury.  Left shoulder x-rays taken in the office on 8/16/2024 reviewed, demonstrating a displaced midshaft clavicle fracture.

## 2024-08-20 NOTE — REASON FOR VISIT
[FreeTextEntry2] : Injury: Left closed displaced clavicle fracture Date of injury: 8/16/2024 status post fall off a chair

## 2024-08-20 NOTE — ASSESSMENT
[FreeTextEntry1] : Assessment: 4 days out from sustaining a left displaced midshaft clavicle fracture.  Plan: 1.  Clinical and radiographic findings are reviewed with the patient and her family.  I reviewed her x-rays with them. 2.  For this injury, I recommended nonoperative management.  Recommend nonweightbearing to left upper extremity with range of motion as tolerated once pain improves.  Can use sling as needed for comfort.  I discussed that she can come out of the sling if that is more comfortable for her.  I encouraged her to do elbow/wrist/finger range of motion. 3.  I discussed red flag symptoms which include but are not limited to skin tenting over the level of the fracture.  They understand. 4.  I discussed that the ecchymosis is to be expected with this type of injury. 5.  I would like to see her back for follow-up in about 2 weeks with repeat x-rays of the left clavicle.  Plan of care discussed with the patient and her family.  They are in agreement and all questions were answered.  X-rays needed at next visit: Left clavicle

## 2024-09-05 ENCOUNTER — APPOINTMENT (OUTPATIENT)
Dept: ORTHOPEDIC SURGERY | Facility: CLINIC | Age: 74
End: 2024-09-05
Payer: MEDICARE

## 2024-09-05 DIAGNOSIS — S42.022A DISPLACED FRACTURE OF SHAFT OF LEFT CLAVICLE, INITIAL ENCOUNTER FOR CLOSED FRACTURE: ICD-10-CM

## 2024-09-05 PROCEDURE — 73000 X-RAY EXAM OF COLLAR BONE: CPT | Mod: LT

## 2024-09-05 PROCEDURE — 99024 POSTOP FOLLOW-UP VISIT: CPT

## 2024-09-05 NOTE — HISTORY OF PRESENT ILLNESS
[de-identified] : The patient is a 74 year old female with a history of dementia, who is now approximately 3 weeks status post sustaining a left displaced clavicle fracture that is being treated nonoperatively. She is accompanied by a her  and another  who assisted with interpretation. She continues to have pain over the left shoulder, but is improving. She has been in and out of the sling. She is taking Tylenol.  The patient is well appearing. Examination of the left shoulder demonstrates intact skin. Visible and palpable deformity over the level of the clavicle fracture that is tender to palpation. No skin tenting. Active shoulder abduction to about 20 degrees. Passive shoulder abduction to about 30-35 degrees limited by pain. Sensation intact over lateral shoulder. Neurovascularly intact distally  Left clavicle xrays taken in the office today were personally reviewed, demonstrating a left >100% displaced mid-clavicle fracture with some comminution

## 2024-09-05 NOTE — ASSESSMENT
[FreeTextEntry1] : Assessment: 3 weeks status post sustaining a left displaced clavicle fracture being treated nonoperatively  Plan: 1. Clinical and radiographic findings were discussed with the patient and her . I reviewed today's xrays with them. 2. I discussed the fracture displacement on xray and the risk of fracture nonunion / malunion. She is at risk for fracture nonunion given the >100% displacement. However, given the patient's age and dementia, I would recommend continuing with conservative management. Her pain has been improving. Should she progress to a symptomatic nonunion, we can discuss the option of surgical intervention at that time. 3. Continue nonoperative management with NWB to the left upper extremity except for <2-3 lbs for ADLs. Continue shoulder/elbow/wrist ROM as tolerated. Can discontinue sling. Encouraged shoulder range of motion to avoid stiffness. I recommended starting physical therapy to start restoring shoulder range of motion. I provided her with a referral for physical therapy. 4. I would like to see the patient back for follow up in about 4-6 weeks with repeat xrays of the left clavicle. Plan of care discussed with the patient and her . They are in agreement and all questions were answered.  Xrays needed at next visit: Left clavicle

## 2024-09-05 NOTE — REASON FOR VISIT
[FreeTextEntry2] : Injury: Left closed displaced clavicle fracture Date of injury: 8/16/2024 status post fall off a chair.

## 2024-09-23 RX ORDER — EPINEPHRINE 0.3 MG/.3ML
0.3 INJECTION INTRAMUSCULAR
Qty: 2 | Refills: 2 | Status: ACTIVE | COMMUNITY
Start: 2024-09-23 | End: 1900-01-01

## 2024-10-02 ENCOUNTER — APPOINTMENT (OUTPATIENT)
Dept: ORTHOPEDIC SURGERY | Facility: CLINIC | Age: 74
End: 2024-10-02
Payer: MEDICARE

## 2024-10-02 DIAGNOSIS — S42.022A DISPLACED FRACTURE OF SHAFT OF LEFT CLAVICLE, INITIAL ENCOUNTER FOR CLOSED FRACTURE: ICD-10-CM

## 2024-10-02 PROCEDURE — 73000 X-RAY EXAM OF COLLAR BONE: CPT | Mod: LT

## 2024-10-02 PROCEDURE — 99024 POSTOP FOLLOW-UP VISIT: CPT

## 2024-10-02 NOTE — ASSESSMENT
[FreeTextEntry1] : Assessment: 6-1/2 weeks out from sustaining a left displaced clavicle fracture being treated nonoperatively  Plan: 1.  Clinical and radiographic findings were reviewed with the patient, and her , and her daughter.  I reviewed today's x-rays with them. 2.  I again discussed with them that given the over 100% fracture displacement, the patient is likely to progress to nonunion.  However, given the patient's age and dementia, I would recommend continuing with conservative management.  Her pain does seem to be improving.  Her shoulder range of motion is improved today compared to her last visit.  We did discuss that should she progress to a symptomatic nonunion, we can discuss the option of surgical intervention at that time. 3.  Can advance to weightbearing as tolerated to the left upper extremity with range of motion and progressive strengthening as tolerated.  Encouraged continued physical therapy to work on restoring shoulder range of motion.  I provided them with an updated referral for physical therapy to advance weightbearing and begin strengthening 4.  I would like to see the patient back for follow-up in about 6 to 8 weeks with repeat x-rays of the left clavicle.  Plan of care discussed with the patient, her , and her daughter.  They are in agreement and all questions were answered.  I encouraged him to reach out to the office should any questions or concerns arise prior to her next visit.  X-rays needed next visit: Left clavicle

## 2024-10-02 NOTE — HISTORY OF PRESENT ILLNESS
[de-identified] : The patient is a 74-year-old female with a history of dementia, who is now approximately 6-1/2 weeks status post sustaining a left displaced clavicle fracture that is being treated nonoperatively.  She is accompanied by her daughter and her  today.  Her daughter assisted with interpretation.  Her daughter reports that her pain seems to be improving.  She has been doing physical therapy 2 times a week.  She is no longer using a sling.  She reports that the patient has been going on walks with her  but does have pain in her shoulder after a period of walking.  No skin issues.  The patient is well-appearing.  Examination of the left shoulder demonstrates intact skin.  There is a visible and palpable deformity over the level of the clavicle fracture.  This is mildly tender to palpation.  There is no skin tenting. Shoulder abduction to about 70 degrees today.  Passive shoulder abduction to about 80 to 90 degrees limited by pain.    Neurovascularly intact distally.  Left clavicle x-rays taken in the office today were personally reviewed, demonstrating a over 100% displaced mid clavicle fracture with some comminution.  There is no significant evidence of interval healing or callus formation

## 2024-11-11 ENCOUNTER — APPOINTMENT (OUTPATIENT)
Dept: ORTHOPEDIC SURGERY | Facility: CLINIC | Age: 74
End: 2024-11-11

## 2024-11-21 ENCOUNTER — APPOINTMENT (OUTPATIENT)
Dept: ORTHOPEDIC SURGERY | Facility: CLINIC | Age: 74
End: 2024-11-21
Payer: MEDICARE

## 2024-11-21 DIAGNOSIS — S42.022A DISPLACED FRACTURE OF SHAFT OF LEFT CLAVICLE, INITIAL ENCOUNTER FOR CLOSED FRACTURE: ICD-10-CM

## 2024-11-21 PROCEDURE — 73000 X-RAY EXAM OF COLLAR BONE: CPT | Mod: LT

## 2024-11-21 PROCEDURE — 99213 OFFICE O/P EST LOW 20 MIN: CPT

## 2025-02-06 ENCOUNTER — APPOINTMENT (OUTPATIENT)
Dept: ORTHOPEDIC SURGERY | Facility: CLINIC | Age: 75
End: 2025-02-06
Payer: MEDICARE

## 2025-02-06 DIAGNOSIS — S42.022A DISPLACED FRACTURE OF SHAFT OF LEFT CLAVICLE, INITIAL ENCOUNTER FOR CLOSED FRACTURE: ICD-10-CM

## 2025-02-06 DIAGNOSIS — F02.80 ALZHEIMER'S DISEASE, UNSPECIFIED: ICD-10-CM

## 2025-02-06 DIAGNOSIS — G30.9 ALZHEIMER'S DISEASE, UNSPECIFIED: ICD-10-CM

## 2025-02-06 PROCEDURE — 73000 X-RAY EXAM OF COLLAR BONE: CPT | Mod: LT

## 2025-02-06 PROCEDURE — 99213 OFFICE O/P EST LOW 20 MIN: CPT

## 2025-05-21 RX ORDER — MOMETASONE 50 UG/1
50 SPRAY, METERED NASAL DAILY
Qty: 1 | Refills: 3 | Status: ACTIVE | COMMUNITY
Start: 2025-05-21 | End: 1900-01-01

## 2025-06-06 RX ORDER — LEVOCETIRIZINE DIHYDROCHLORIDE 5 MG/1
5 TABLET ORAL DAILY
Qty: 90 | Refills: 2 | Status: ACTIVE | COMMUNITY
Start: 2025-06-06 | End: 1900-01-01

## 2025-06-06 RX ORDER — FLUTICASONE PROPIONATE 50 UG/1
50 SPRAY, METERED NASAL DAILY
Qty: 1 | Refills: 6 | Status: ACTIVE | COMMUNITY
Start: 2025-06-06 | End: 1900-01-01